# Patient Record
(demographics unavailable — no encounter records)

---

## 2025-05-23 NOTE — HISTORY OF PRESENT ILLNESS
[FreeTextEntry1] : Ms. JOYA 23 year old female presents for a new patient evaluation for headaches.  Patient reports experiencing sharp, nerve-pinching type pain in various areas of her head for the past couple of years. The pain occurs every few months and is sporadic and unpredictable. The most recent severe episode was in March, with 3-4 occurrences within a month. Pain is described as a sharp pinch that can cause body twitching on the affected side. Pain can occur on either side of the head but doesn't switch sides during an episode. Locations include temples, back of the head, and the base of the skull- favoring the back of the skull. Last episode lasted about 5 days- on and off sensation x5 days. Patient reports ongoing pressure after the last episode. She has been seen at [facility] ER for this in the past, where a CAT scan was performed and per patient came back unremarkable. Pain intensity can reach 10/10. Excedrin, which previously helped, was ineffective during the last episode. Does experience tenderness on the areas after. She denies associated migraine features, denies autonomic features. Denies other notable triggers.

## 2025-05-23 NOTE — ASSESSMENT
[FreeTextEntry1] : Ms. JOYA 23 year old female presents for a new patient evaluation for headaches, not clear headache type possibly neuralgiform headaches, recommend neuroimaging to rule out intracranial / vascular etiology.   -MR head w/wo -MRA H/N W/w/o -Indomethacin trial at next cluster -Start ha diary  -Return to clinic in 3 mo

## 2025-05-23 NOTE — PHYSICAL EXAM
[FreeTextEntry1] : Mental status: Awake, alert and oriented x3.  Recent and remote memory intact.  Naming, repetition and comprehension intact.  Attention/concentration intact.  No dysarthria, no aphasia.  Fund of knowledge appropriate.   Cranial nerves: Pupils equally round and reactive to light, visual fields full, no nystagmus, extraocular muscles intact, V1 through V3 intact bilaterally and symmetric, face symmetric, hearing intact to finger rub, palate elevation symmetric, tongue was midline.  Motor:  MRC grading 5/5 b/l UE/LE.   strength 5/5.  Normal tone and bulk.  No abnormal movements.   Sensation: Intact to light touch, temperature, vibration, and pinprick.  Coordination: No dysmetria on finger-to-nose.  Reflexes: 2+ in bilateral UE/LE.  Gait: Narrow and steady. No ataxia.